# Patient Record
Sex: FEMALE | Race: WHITE | NOT HISPANIC OR LATINO | URBAN - NONMETROPOLITAN AREA
[De-identification: names, ages, dates, MRNs, and addresses within clinical notes are randomized per-mention and may not be internally consistent; named-entity substitution may affect disease eponyms.]

---

## 2021-07-19 ENCOUNTER — OFFICE VISIT (OUTPATIENT)
Dept: URBAN - NONMETROPOLITAN AREA CLINIC 2 | Facility: CLINIC | Age: 46
End: 2021-07-19
Payer: COMMERCIAL

## 2021-07-19 ENCOUNTER — WEB ENCOUNTER (OUTPATIENT)
Dept: URBAN - NONMETROPOLITAN AREA CLINIC 2 | Facility: CLINIC | Age: 46
End: 2021-07-19

## 2021-07-19 ENCOUNTER — LAB OUTSIDE AN ENCOUNTER (OUTPATIENT)
Dept: URBAN - NONMETROPOLITAN AREA CLINIC 2 | Facility: CLINIC | Age: 46
End: 2021-07-19

## 2021-07-19 VITALS
BODY MASS INDEX: 22.91 KG/M2 | WEIGHT: 146 LBS | SYSTOLIC BLOOD PRESSURE: 104 MMHG | HEIGHT: 67 IN | HEART RATE: 67 BPM | DIASTOLIC BLOOD PRESSURE: 67 MMHG

## 2021-07-19 DIAGNOSIS — K57.32 DIVERTICULITIS OF LARGE INTESTINE WITHOUT PERFORATION OR ABSCESS WITHOUT BLEEDING: ICD-10-CM

## 2021-07-19 DIAGNOSIS — N80.9 ENDOMETRIOSIS: ICD-10-CM

## 2021-07-19 DIAGNOSIS — Z72.0 TOBACCO USE: ICD-10-CM

## 2021-07-19 PROCEDURE — 99204 OFFICE O/P NEW MOD 45 MIN: CPT | Performed by: INTERNAL MEDICINE

## 2021-07-19 NOTE — PHYSICAL EXAM HENT:
Head,  normocephalic,  atraumatic,  Face,  Face within normal limits,  Ears,  External ears within normal limits,  Nose/Nasopharynx,  External nose  normal appearance,  nares patent,  no nasal discharge,  Mouth and Throat,  Oral cavity appearance normal, Lips,  Appearance normal  not examined

## 2021-07-19 NOTE — HPI-TODAY'S VISIT:
7/19/2021: Initial Gastroenterology Clinic Visit  45 year old female with past medical history of tobacco use, diverticulitis c/b abscess, endometriosis who presents for evaluation of abdominal discomfort.  Ms. Reagan has had a histroy of diverticulitis in 2014 c/b abscess. She notes that since her diagnosis of diverticulitis c/b abscess in 2014 she had had several episodes that feel like diverticulitis episodes where she will experience discomfort in the pelvic region/left side of the abdomen as well as abdominal bloating. She most recently experienced these symptoms three weeks ago when she ate a heavy meal while in [place]. Following the heavy meal, she experienced left lower quadrant abdominal discomfort. She subsequently transitioned to a clear liquid/low fiber diet which led to resolution of symptoms. She denied fevers or chills. She denied vomiting.  She did not have a CT scan following the most recent episode of abdominal discomfort.  She did not have a colonoscopy following her episode of diverticulitis in 2014.   She denies family history of inflammatory bowel disease or colon cancer.  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

## 2021-07-19 NOTE — PHYSICAL EXAM GASTROINTESTINAL
Abdomen , soft, slight tenderness in the left lower quadrant , nondistended , no guarding or rigidity , no masses palpable , normal bowel sounds , Liver and Spleen , no hepatomegaly present , no hepatosplenomegaly , liver nontender , spleen not palpable

## 2021-07-20 LAB
A/G RATIO: 1.6
ALBUMIN: 4.4
ALKALINE PHOSPHATASE: 65
ALT (SGPT): 7
AST (SGOT): 12
BASO (ABSOLUTE): 0.1
BASOS: 1
BILIRUBIN, TOTAL: 0.2
BUN/CREATININE RATIO: 20
BUN: 14
C-REACTIVE PROTEIN, QUANT: <1
CALCIUM: 9.1
CARBON DIOXIDE, TOTAL: 21
CHLORIDE: 104
CREATININE: 0.69
EGFR IF AFRICN AM: 122
EGFR IF NONAFRICN AM: 106
EOS (ABSOLUTE): 0.3
EOS: 5
GLOBULIN, TOTAL: 2.7
GLUCOSE: 79
HEMATOCRIT: 43.7
HEMATOLOGY COMMENTS:: (no result)
HEMOGLOBIN: 14.2
IMMATURE CELLS: (no result)
IMMATURE GRANS (ABS): 0
IMMATURE GRANULOCYTES: 0
LYMPHS (ABSOLUTE): 2.4
LYMPHS: 33
MCH: 32.3
MCHC: 32.5
MCV: 99
MONOCYTES(ABSOLUTE): 0.4
MONOCYTES: 5
NEUTROPHILS (ABSOLUTE): 4.1
NEUTROPHILS: 56
NRBC: (no result)
PLATELETS: 375
POTASSIUM: 4.7
PROTEIN, TOTAL: 7.1
RBC: 4.4
RDW: 12.9
SODIUM: 140
WBC: 7.4

## 2021-07-24 PROBLEM — 4494009: Status: ACTIVE | Noted: 2021-07-19

## 2021-07-24 PROBLEM — 129103003: Status: ACTIVE | Noted: 2021-07-24

## 2021-08-16 ENCOUNTER — TELEPHONE ENCOUNTER (OUTPATIENT)
Dept: URBAN - NONMETROPOLITAN AREA CLINIC 2 | Facility: CLINIC | Age: 46
End: 2021-08-16

## 2021-08-17 ENCOUNTER — TELEPHONE ENCOUNTER (OUTPATIENT)
Dept: URBAN - METROPOLITAN AREA CLINIC 40 | Facility: CLINIC | Age: 46
End: 2021-08-17

## 2021-08-20 ENCOUNTER — TELEPHONE ENCOUNTER (OUTPATIENT)
Dept: URBAN - METROPOLITAN AREA CLINIC 23 | Facility: CLINIC | Age: 46
End: 2021-08-20

## 2021-08-25 ENCOUNTER — LAB OUTSIDE AN ENCOUNTER (OUTPATIENT)
Dept: URBAN - NONMETROPOLITAN AREA CLINIC 2 | Facility: CLINIC | Age: 46
End: 2021-08-25

## 2021-08-25 ENCOUNTER — TELEPHONE ENCOUNTER (OUTPATIENT)
Dept: URBAN - NONMETROPOLITAN AREA CLINIC 2 | Facility: CLINIC | Age: 46
End: 2021-08-25

## 2021-08-25 PROBLEM — 300331000: Status: ACTIVE | Noted: 2021-08-25

## 2021-09-17 ENCOUNTER — OFFICE VISIT (OUTPATIENT)
Dept: URBAN - NONMETROPOLITAN AREA CLINIC 2 | Facility: CLINIC | Age: 46
End: 2021-09-17

## 2021-10-11 ENCOUNTER — LAB OUTSIDE AN ENCOUNTER (OUTPATIENT)
Dept: URBAN - NONMETROPOLITAN AREA CLINIC 2 | Facility: CLINIC | Age: 46
End: 2021-10-11

## 2021-10-11 ENCOUNTER — TELEPHONE ENCOUNTER (OUTPATIENT)
Dept: URBAN - NONMETROPOLITAN AREA CLINIC 2 | Facility: CLINIC | Age: 46
End: 2021-10-11

## 2021-11-01 ENCOUNTER — OFFICE VISIT (OUTPATIENT)
Dept: URBAN - NONMETROPOLITAN AREA CLINIC 2 | Facility: CLINIC | Age: 46
End: 2021-11-01
Payer: COMMERCIAL

## 2021-11-01 VITALS
TEMPERATURE: 97 F | DIASTOLIC BLOOD PRESSURE: 86 MMHG | HEART RATE: 83 BPM | BODY MASS INDEX: 23.54 KG/M2 | WEIGHT: 150 LBS | HEIGHT: 67 IN | SYSTOLIC BLOOD PRESSURE: 126 MMHG

## 2021-11-01 DIAGNOSIS — Z12.11 COLON CANCER SCREENING: ICD-10-CM

## 2021-11-01 DIAGNOSIS — Z87.19 HISTORY OF DIVERTICULITIS: ICD-10-CM

## 2021-11-01 DIAGNOSIS — K76.9 LIVER LESION: ICD-10-CM

## 2021-11-01 DIAGNOSIS — Z72.0 TOBACCO USE: ICD-10-CM

## 2021-11-01 PROCEDURE — 99214 OFFICE O/P EST MOD 30 MIN: CPT | Performed by: INTERNAL MEDICINE

## 2021-11-01 RX ORDER — POLYETHYLENE GLYCOL 3350, SODIUM SULFATE, SODIUM CHLORIDE, POTASSIUM CHLORIDE, ASCORBIC ACID, SODIUM ASCORBATE 140-9-5.2G
AS DIRECTED KIT ORAL AS DIRECTED
Qty: 280 GRAM | Refills: 0 | OUTPATIENT
Start: 2021-11-01 | End: 2021-11-02

## 2021-11-01 NOTE — HPI-TODAY'S VISIT:
7/19/2021: Initial Gastroenterology Clinic Visit    45 year old female with past medical history of tobacco use, diverticulitis c/b abscess, endometriosis who presents for evaluation of abdominal discomfort.  Ms. Reagan has had a history of diverticulitis in 2014 c/b abscess. She notes that since her diagnosis of diverticulitis c/b abscess in 2014 she had had several episodes that feel like diverticulitis episodes where she will experience discomfort in the pelvic region/left side of the abdomen as well as abdominal bloating. She most recently experienced these symptoms three weeks ago when she ate a heavy meal while in [place]. Following the heavy meal, she experienced left lower quadrant abdominal discomfort. She subsequently transitioned to a clear liquid/low fiber diet which led to resolution of symptoms. She denied fevers or chills. She denied vomiting.  She did not have a CT scan following the most recent episode of abdominal discomfort.  She did not have a colonoscopy following her episode of diverticulitis in 2014.   She denies family history of inflammatory bowel disease or colon cancer.  She currently works in [employer] on [employer].  7/19/2021: CBC, chemistry panel, LFTs normal. CRP normal. 8/17/2021: CT Abdomen and Pelvis with Contrast  IMPRESSION: 1. No acute findings in abdomen or pelvis. 2. Pancolonic diverticulosis. 10/6/2021: MRI Liver IMPRESSION: 1.  Indeterminate region of nonenhancement along the falciform ligament, as detailed above. Lesion has a benign appearance, and is stable in size dating back to 8/26/2014. Consider 12 month follow-up, if clinically warranted.  11/1/2021: Gastroenterology Follow-Up Visit Since Ms. Reagan's most recent clinic visit, she was diagnosed with COVID and has been dealing with symptomsf of long COVID. She has not had any episodes of abdominal pain. She denies symptoms similar to her prior episodes of diverticulitis.

## 2021-12-29 ENCOUNTER — OFFICE VISIT (OUTPATIENT)
Dept: URBAN - NONMETROPOLITAN AREA SURGERY CENTER 1 | Facility: SURGERY CENTER | Age: 46
End: 2021-12-29

## 2022-02-25 ENCOUNTER — OFFICE VISIT (OUTPATIENT)
Dept: URBAN - NONMETROPOLITAN AREA CLINIC 2 | Facility: CLINIC | Age: 47
End: 2022-02-25

## 2022-04-24 ENCOUNTER — TELEPHONE ENCOUNTER (OUTPATIENT)
Dept: URBAN - NONMETROPOLITAN AREA CLINIC 2 | Facility: CLINIC | Age: 47
End: 2022-04-24

## 2022-05-27 ENCOUNTER — WEB ENCOUNTER (OUTPATIENT)
Dept: URBAN - NONMETROPOLITAN AREA SURGERY CENTER 1 | Facility: SURGERY CENTER | Age: 47
End: 2022-05-27

## 2022-05-27 ENCOUNTER — TELEPHONE ENCOUNTER (OUTPATIENT)
Dept: URBAN - NONMETROPOLITAN AREA CLINIC 2 | Facility: CLINIC | Age: 47
End: 2022-05-27

## 2022-06-02 ENCOUNTER — OFFICE VISIT (OUTPATIENT)
Dept: URBAN - NONMETROPOLITAN AREA SURGERY CENTER 1 | Facility: SURGERY CENTER | Age: 47
End: 2022-06-02
Payer: COMMERCIAL

## 2022-06-02 ENCOUNTER — TELEPHONE ENCOUNTER (OUTPATIENT)
Dept: URBAN - NONMETROPOLITAN AREA CLINIC 2 | Facility: CLINIC | Age: 47
End: 2022-06-02

## 2022-06-02 DIAGNOSIS — Z12.11 COLON CANCER SCREENING: ICD-10-CM

## 2022-06-02 DIAGNOSIS — Z53.8 FAILED ATTEMPTED SURGICAL PROCEDURE: ICD-10-CM

## 2022-06-02 PROCEDURE — 45378 DIAGNOSTIC COLONOSCOPY: CPT | Performed by: INTERNAL MEDICINE

## 2022-06-02 PROCEDURE — G8907 PT DOC NO EVENTS ON DISCHARG: HCPCS | Performed by: INTERNAL MEDICINE

## 2022-06-14 ENCOUNTER — OFFICE VISIT (OUTPATIENT)
Dept: URBAN - NONMETROPOLITAN AREA SURGERY CENTER 1 | Facility: SURGERY CENTER | Age: 47
End: 2022-06-14
Payer: COMMERCIAL

## 2022-06-14 ENCOUNTER — TELEPHONE ENCOUNTER (OUTPATIENT)
Dept: URBAN - NONMETROPOLITAN AREA CLINIC 2 | Facility: CLINIC | Age: 47
End: 2022-06-14

## 2022-06-14 DIAGNOSIS — K56.690 OTHER PARTIAL INTESTINAL OBSTRUCTION: ICD-10-CM

## 2022-06-14 DIAGNOSIS — Z53.8 FAILED ATTEMPTED SURGICAL PROCEDURE: ICD-10-CM

## 2022-06-14 DIAGNOSIS — Z12.11 COLON CANCER SCREENING: ICD-10-CM

## 2022-06-14 PROCEDURE — 45378 DIAGNOSTIC COLONOSCOPY: CPT | Performed by: INTERNAL MEDICINE

## 2022-06-14 PROCEDURE — G8907 PT DOC NO EVENTS ON DISCHARG: HCPCS | Performed by: INTERNAL MEDICINE

## 2022-07-13 ENCOUNTER — OFFICE VISIT (OUTPATIENT)
Dept: URBAN - NONMETROPOLITAN AREA CLINIC 13 | Facility: CLINIC | Age: 47
End: 2022-07-13
Payer: COMMERCIAL

## 2022-07-13 ENCOUNTER — TELEPHONE ENCOUNTER (OUTPATIENT)
Dept: URBAN - METROPOLITAN AREA CLINIC 6 | Facility: CLINIC | Age: 47
End: 2022-07-13

## 2022-07-13 ENCOUNTER — LAB OUTSIDE AN ENCOUNTER (OUTPATIENT)
Dept: URBAN - NONMETROPOLITAN AREA CLINIC 13 | Facility: CLINIC | Age: 47
End: 2022-07-13

## 2022-07-13 VITALS
BODY MASS INDEX: 26.21 KG/M2 | HEART RATE: 76 BPM | DIASTOLIC BLOOD PRESSURE: 65 MMHG | WEIGHT: 167 LBS | HEIGHT: 67 IN | SYSTOLIC BLOOD PRESSURE: 100 MMHG

## 2022-07-13 DIAGNOSIS — R93.3 ABNORMAL COLONOSCOPY: ICD-10-CM

## 2022-07-13 DIAGNOSIS — Z72.0 TOBACCO USE: ICD-10-CM

## 2022-07-13 DIAGNOSIS — Z12.11 COLON CANCER SCREENING: ICD-10-CM

## 2022-07-13 DIAGNOSIS — Z87.19 HISTORY OF DIVERTICULITIS: ICD-10-CM

## 2022-07-13 DIAGNOSIS — K76.9 LIVER LESION: ICD-10-CM

## 2022-07-13 PROCEDURE — 99214 OFFICE O/P EST MOD 30 MIN: CPT | Performed by: INTERNAL MEDICINE

## 2022-07-13 RX ORDER — ATORVASTATIN CALCIUM 20 MG/1
1 TABLET TABLET, FILM COATED ORAL ONCE A DAY
Status: ACTIVE | COMMUNITY

## 2022-07-13 RX ORDER — AMITRIPTYLINE HYDROCHLORIDE 10 MG/1
1 TABLET AT BEDTIME TABLET, FILM COATED ORAL ONCE A DAY
Status: ACTIVE | COMMUNITY

## 2022-07-13 NOTE — HPI-TODAY'S VISIT:
7/19/2021: Initial Gastroenterology Clinic Visit      45 year old female with past medical history of tobacco use, diverticulitis c/b abscess, endometriosis who presents for evaluation of abdominal discomfort.  Ms. Reagan has had a history of diverticulitis in 2014 c/b abscess. She notes that since her diagnosis of diverticulitis c/b abscess in 2014 she had had several episodes that feel like diverticulitis episodes where she will experience discomfort in the pelvic region/left side of the abdomen as well as abdominal bloating. She most recently experienced these symptoms three weeks ago when she ate a heavy meal while in [place]. Following the heavy meal, she experienced left lower quadrant abdominal discomfort. She subsequently transitioned to a clear liquid/low fiber diet which led to resolution of symptoms. She denied fevers or chills. She denied vomiting.  She did not have a CT scan following the most recent episode of abdominal discomfort.  She did not have a colonoscopy following her episode of diverticulitis in 2014.   She denies family history of inflammatory bowel disease or colon cancer.  She currently works in [employer] on [employer].  7/19/2021: CBC, chemistry panel, LFTs normal. CRP normal. 8/17/2021: CT Abdomen and Pelvis with Contrast  IMPRESSION: 1. No acute findings in abdomen or pelvis. 2. Pancolonic diverticulosis. 10/6/2021: MRI Liver IMPRESSION: 1.  Indeterminate region of nonenhancement along the falciform ligament, as detailed above. Lesion has a benign appearance, and is stable in size dating back to 8/26/2014. Consider 12 month follow-up, if clinically warranted.  11/1/2021: Gastroenterology Follow-Up Visit Since Ms. Reagan's most recent clinic visit, she was diagnosed with COVID and has been dealing with symptoms of long COVID. She has not had any episodes of abdominal pain. She denies symptoms similar to her prior episodes of diverticulitis.  6/2/2022: Colonoscopy  Multiple small and large mouthed diverticula were found in the sigmoid colon. There was significant muscular hypertrophy of the sigmoid colon with restricted mobility of the colon. Several attempts were made at advancing the colonoscope through the sigmoid colon including placing Ms. Reagan on her back, withdrawing the colonoscope from the colon and re-inserting, and applying abdominal pressure throughout the procedure. Despite efforts, the colonoscope was unable to be advanced to the cecum. As a result, the colonoscopy was aborted. 6/14/2022: Colonoscopy  Many small and large mouthed diverticula were found in the sigmoid colon, transverse colon, ascending colon. Benign appearing intrinsic stenosis measuring 8 cm in length was found in the rectosigmoid colon and was traversed with a gastroscope (the colonoscope was unable to traverse the stricture). Mucosa was erythematous in this region likely indicating inflammatory stricture. The gastroscope was unable to be advanced to the cecum. The exam was otherwise without abnormality on direct and retroflexion.  7/13/2022: Gastroenterology Follow-Up Visit Ms. Reagan is currently being evaluated for long-COVID. Abdominal symptoms are stable. Weight is stable. Denies melena or hematochezia.

## 2022-07-20 ENCOUNTER — TELEPHONE ENCOUNTER (OUTPATIENT)
Dept: URBAN - NONMETROPOLITAN AREA CLINIC 2 | Facility: CLINIC | Age: 47
End: 2022-07-20

## 2022-07-21 ENCOUNTER — WEB ENCOUNTER (OUTPATIENT)
Dept: URBAN - NONMETROPOLITAN AREA CLINIC 2 | Facility: CLINIC | Age: 47
End: 2022-07-21

## 2022-07-21 PROBLEM — 62315008: Status: ACTIVE | Noted: 2022-07-21

## 2022-08-15 ENCOUNTER — TELEPHONE ENCOUNTER (OUTPATIENT)
Dept: URBAN - NONMETROPOLITAN AREA CLINIC 2 | Facility: CLINIC | Age: 47
End: 2022-08-15

## 2022-08-15 RX ORDER — AMITRIPTYLINE HYDROCHLORIDE 10 MG/1
1 TABLET AT BEDTIME TABLET, FILM COATED ORAL ONCE A DAY
Status: ACTIVE | COMMUNITY

## 2022-08-15 RX ORDER — ATORVASTATIN CALCIUM 20 MG/1
1 TABLET TABLET, FILM COATED ORAL ONCE A DAY
Status: ACTIVE | COMMUNITY

## 2022-08-15 RX ORDER — POLYETHYLENE GLYCOL 3350, SODIUM SULFATE ANHYDROUS, SODIUM BICARBONATE, SODIUM CHLORIDE, POTASSIUM CHLORIDE 236; 22.74; 6.74; 5.86; 2.97 G/4L; G/4L; G/4L; G/4L; G/4L
AS DIRECTED POWDER, FOR SOLUTION ORAL ONCE
Qty: 1 GALLON | Refills: 0 | OUTPATIENT
Start: 2022-08-15 | End: 2022-08-16

## 2022-08-19 ENCOUNTER — TELEPHONE ENCOUNTER (OUTPATIENT)
Dept: URBAN - NONMETROPOLITAN AREA CLINIC 2 | Facility: CLINIC | Age: 47
End: 2022-08-19

## 2022-09-23 ENCOUNTER — TELEPHONE ENCOUNTER (OUTPATIENT)
Dept: URBAN - NONMETROPOLITAN AREA CLINIC 2 | Facility: CLINIC | Age: 47
End: 2022-09-23

## 2022-11-07 ENCOUNTER — OFFICE VISIT (OUTPATIENT)
Dept: URBAN - NONMETROPOLITAN AREA CLINIC 2 | Facility: CLINIC | Age: 47
End: 2022-11-07
Payer: COMMERCIAL

## 2022-11-07 VITALS
HEART RATE: 118 BPM | HEIGHT: 67 IN | SYSTOLIC BLOOD PRESSURE: 116 MMHG | DIASTOLIC BLOOD PRESSURE: 81 MMHG | WEIGHT: 170 LBS | BODY MASS INDEX: 26.68 KG/M2

## 2022-11-07 DIAGNOSIS — Z12.11 COLON CANCER SCREENING: ICD-10-CM

## 2022-11-07 DIAGNOSIS — R93.3 ABNORMAL COLONOSCOPY: ICD-10-CM

## 2022-11-07 DIAGNOSIS — K76.9 LIVER LESION: ICD-10-CM

## 2022-11-07 DIAGNOSIS — Z87.19 HISTORY OF DIVERTICULITIS: ICD-10-CM

## 2022-11-07 DIAGNOSIS — K56.699 DIVERTICULAR STRICTURE: ICD-10-CM

## 2022-11-07 PROCEDURE — 99214 OFFICE O/P EST MOD 30 MIN: CPT | Performed by: INTERNAL MEDICINE

## 2022-11-07 RX ORDER — AMITRIPTYLINE HYDROCHLORIDE 10 MG/1
1 TABLET AT BEDTIME TABLET, FILM COATED ORAL ONCE A DAY
Status: ACTIVE | COMMUNITY

## 2022-11-07 RX ORDER — ATORVASTATIN CALCIUM 20 MG/1
1 TABLET TABLET, FILM COATED ORAL ONCE A DAY
Status: ACTIVE | COMMUNITY

## 2022-11-07 NOTE — PHYSICAL EXAM GASTROINTESTINAL
Abdomen , soft, nontender, nondistended , no guarding or rigidity , no masses palpable , normal bowel sounds , ileostomy present

## 2022-11-07 NOTE — HPI-TODAY'S VISIT:
7/19/2021: Initial Gastroenterology Clinic Visit         45 year old female with past medical history of tobacco use, diverticulitis c/b abscess, endometriosis who presents for evaluation of abdominal discomfort.  Ms. Reagan has had a history of diverticulitis in 2014 c/b abscess. She notes that since her diagnosis of diverticulitis c/b abscess in 2014 she had had several episodes that feel like diverticulitis episodes where she will experience discomfort in the pelvic region/left side of the abdomen as well as abdominal bloating. She most recently experienced these symptoms three weeks ago when she ate a heavy meal while in [place]. Following the heavy meal, she experienced left lower quadrant abdominal discomfort. She subsequently transitioned to a clear liquid/low fiber diet which led to resolution of symptoms. She denied fevers or chills. She denied vomiting.  She did not have a CT scan following the most recent episode of abdominal discomfort.  She did not have a colonoscopy following her episode of diverticulitis in 2014.   She denies family history of inflammatory bowel disease or colon cancer.  She currently works in [employer] on [employer].  7/19/2021: CBC, chemistry panel, LFTs normal. CRP normal. 8/17/2021: CT Abdomen and Pelvis with Contrast  IMPRESSION: 1. No acute findings in abdomen or pelvis. 2. Pancolonic diverticulosis. 10/6/2021: MRI Liver IMPRESSION: 1.  Indeterminate region of nonenhancement along the falciform ligament, as detailed above. Lesion has a benign appearance, and is stable in size dating back to 8/26/2014. Consider 12 month follow-up, if clinically warranted.  11/1/2021: Gastroenterology Follow-Up Visit Since Ms. Reagan's most recent clinic visit, she was diagnosed with COVID and has been dealing with symptoms of long COVID. She has not had any episodes of abdominal pain. She denies symptoms similar to her prior episodes of diverticulitis.  6/2/2022: Colonoscopy  Multiple small and large mouthed diverticula were found in the sigmoid colon. There was significant muscular hypertrophy of the sigmoid colon with restricted mobility of the colon. Several attempts were made at advancing the colonoscope through the sigmoid colon including placing Ms. Reagan on her back, withdrawing the colonoscope from the colon and re-inserting, and applying abdominal pressure throughout the procedure. Despite efforts, the colonoscope was unable to be advanced to the cecum. As a result, the colonoscopy was aborted. 6/14/2022: Re-Attempt at Colonoscopy with Dr. Jackson Many small and large mouthed diverticula were found in the sigmoid colon, transverse colon, ascending colon. Benign appearing intrinsic stenosis measuring 8 cm in length was found in the rectosigmoid colon and was traversed with a gastroscope (the colonoscope was unable to traverse the stricture). Mucosa was erythematous in this region likely indicating inflammatory stricture. The gastroscope was unable to be advanced to the cecum. The exam was otherwise without abnormality on direct and retroflexion.  7/13/2022: Gastroenterology Follow-Up Visit Ms. Reagan is currently being evaluated for long-COVID. Abdominal symptoms are stable. Weight is stable. Denies melena or hematochezia.  8/16/2022: Barium Enema IMPRESSION: 1.  7 mm filling defect within the distal transverse colon seen on supine overhead imaging which may represent a polyp. 2.  Mild narrowing and luminal irregularity within the distal descending colon which persists on multiple overhead and spot views measuring a diameter of 8 mm. Correlate with recent colonoscopy. 3.  Colonic diverticulosis.  11/2/2022: Underwent low anterior resection with diverting loop ileostomy with Dr. Domínguez, given suspicion for diverticular stricture in the distal descending colon/sigmoid colon. Surgery revealed extensive diverticular disease with adherence of the sigmoid colon to the pelvic sidewall.  Sigmoid folded over on itself leading to a stricture of the distal sigmoid.  The chronic inflammatory process extended to the proximal rectum requiring a lower resection plane.   11/2/2022: Pathology from surgery A.  SIGMOID COLON AND PROXIMAL RECTUM, RESECTION: DIVERTICULAR DISEASE WITH PERICOLONIC FIBROSIS, LYMPHOHISTIOCYTIC INFLAMMATION, AND FIBROUS ADHESIONS. BENIGN LYMPH NODES. NO MALIGNANCY IDENTIFIED. B.  COLON, LABELED ANASTOMOSIS RINGS, EXCISION: BENIGN SEGMENTS OF LARGE INTESTINE CONSISTENT WITH DONUTS. NO MALIGNANCY IDENTIFIED.  11/7/2022: Gastroenterology Follow-Up Visit Ms. Reagan presents for follow-up. She is continue to follow with Dr. Domínguez following her low anterior resection with diverting loop ileostomy. She has stopped tobacco use.

## 2022-11-21 ENCOUNTER — TELEPHONE ENCOUNTER (OUTPATIENT)
Dept: URBAN - NONMETROPOLITAN AREA CLINIC 2 | Facility: CLINIC | Age: 47
End: 2022-11-21

## 2022-11-21 PROBLEM — 305058001: Status: ACTIVE | Noted: 2021-11-01

## 2022-11-21 PROBLEM — 300331000 LESION OF LIVER: Status: ACTIVE | Noted: 2022-07-12

## 2022-11-21 PROBLEM — 118361000119100: Status: ACTIVE | Noted: 2021-11-09

## 2022-11-21 PROBLEM — 313172000: Status: ACTIVE | Noted: 2022-07-13

## 2022-11-21 PROBLEM — 56226004: Status: ACTIVE | Noted: 2022-11-21

## 2023-04-14 ENCOUNTER — OFFICE VISIT (OUTPATIENT)
Dept: URBAN - NONMETROPOLITAN AREA CLINIC 2 | Facility: CLINIC | Age: 48
End: 2023-04-14
Payer: COMMERCIAL

## 2023-04-14 VITALS
BODY MASS INDEX: 28.56 KG/M2 | DIASTOLIC BLOOD PRESSURE: 73 MMHG | WEIGHT: 182 LBS | HEIGHT: 67 IN | SYSTOLIC BLOOD PRESSURE: 112 MMHG | TEMPERATURE: 97.4 F | HEART RATE: 80 BPM

## 2023-04-14 DIAGNOSIS — Z12.11 COLON CANCER SCREENING: ICD-10-CM

## 2023-04-14 DIAGNOSIS — K56.699 DIVERTICULAR STRICTURE: ICD-10-CM

## 2023-04-14 DIAGNOSIS — K76.9 LIVER LESION: ICD-10-CM

## 2023-04-14 DIAGNOSIS — Z87.19 HISTORY OF DIVERTICULITIS: ICD-10-CM

## 2023-04-14 DIAGNOSIS — K21.9 CHRONIC GERD: ICD-10-CM

## 2023-04-14 DIAGNOSIS — R07.89 OTHER CHEST PAIN: ICD-10-CM

## 2023-04-14 PROBLEM — 235595009: Status: ACTIVE | Noted: 2023-04-14

## 2023-04-14 PROCEDURE — 99214 OFFICE O/P EST MOD 30 MIN: CPT | Performed by: INTERNAL MEDICINE

## 2023-04-14 RX ORDER — ETONOGESTREL 68 MG/1
AS DIRECTED IMPLANT SUBCUTANEOUS
Status: ACTIVE | COMMUNITY

## 2023-04-14 RX ORDER — FAMOTIDINE 40 MG/1
1 TABLET AT BEDTIME TABLET, FILM COATED ORAL ONCE A DAY
Status: ACTIVE | COMMUNITY

## 2023-04-14 RX ORDER — GABAPENTIN 100 MG/1
1 CAPSULE CAPSULE ORAL ONCE A DAY
Status: ACTIVE | COMMUNITY

## 2023-04-14 RX ORDER — ATORVASTATIN CALCIUM 20 MG/1
1 TABLET TABLET, FILM COATED ORAL ONCE A DAY
Status: ACTIVE | COMMUNITY

## 2023-04-14 RX ORDER — CALCIUM CARBONATE 300MG(750)
AS DIRECTED TABLET,CHEWABLE ORAL
Status: ACTIVE | COMMUNITY

## 2023-04-14 RX ORDER — AMITRIPTYLINE HYDROCHLORIDE 10 MG/1
1 TABLET AT BEDTIME TABLET, FILM COATED ORAL ONCE A DAY
Status: ACTIVE | COMMUNITY

## 2023-04-14 NOTE — HPI-TODAY'S VISIT:
7/19/2021: Initial Gastroenterology Clinic Visit         45 year old female with past medical history of tobacco use, diverticulitis c/b abscess, endometriosis who presents for evaluation of abdominal discomfort.  Ms. Reagan has had a history of diverticulitis in 2014 c/b abscess. She notes that since her diagnosis of diverticulitis c/b abscess in 2014 she had had several episodes that feel like diverticulitis episodes where she will experience discomfort in the pelvic region/left side of the abdomen as well as abdominal bloating. She most recently experienced these symptoms three weeks ago when she ate a heavy meal while in [place]. Following the heavy meal, she experienced left lower quadrant abdominal discomfort. She subsequently transitioned to a clear liquid/low fiber diet which led to resolution of symptoms. She denied fevers or chills. She denied vomiting.  She did not have a CT scan following the most recent episode of abdominal discomfort.  She did not have a colonoscopy following her episode of diverticulitis in 2014.   She denies family history of inflammatory bowel disease or colon cancer.  She currently works in [employer] on [employer].  7/19/2021: CBC, chemistry panel, LFTs normal. CRP normal. 8/17/2021: CT Abdomen and Pelvis with Contrast  IMPRESSION: 1. No acute findings in abdomen or pelvis. 2. Pancolonic diverticulosis. 10/6/2021: MRI Liver IMPRESSION: 1.  Indeterminate region of nonenhancement along the falciform ligament, as detailed above. Lesion has a benign appearance, and is stable in size dating back to 8/26/2014. Consider 12 month follow-up, if clinically warranted.  11/1/2021: Gastroenterology Follow-Up Visit Since Ms. Reagan's most recent clinic visit, she was diagnosed with COVID and has been dealing with symptoms of long COVID. She has not had any episodes of abdominal pain. She denies symptoms similar to her prior episodes of diverticulitis.  6/2/2022: Colonoscopy  Multiple small and large mouthed diverticula were found in the sigmoid colon. There was significant muscular hypertrophy of the sigmoid colon with restricted mobility of the colon. Several attempts were made at advancing the colonoscope through the sigmoid colon including placing Ms. Reagan on her back, withdrawing the colonoscope from the colon and re-inserting, and applying abdominal pressure throughout the procedure. Despite efforts, the colonoscope was unable to be advanced to the cecum. As a result, the colonoscopy was aborted. 6/14/2022: Re-Attempt at Colonoscopy with Dr. Jackson Many small and large mouthed diverticula were found in the sigmoid colon, transverse colon, ascending colon. Benign appearing intrinsic stenosis measuring 8 cm in length was found in the rectosigmoid colon and was traversed with a gastroscope (the colonoscope was unable to traverse the stricture). Mucosa was erythematous in this region likely indicating inflammatory stricture. The gastroscope was unable to be advanced to the cecum. The exam was otherwise without abnormality on direct and retroflexion.  7/13/2022: Gastroenterology Follow-Up Visit Ms. Reagan is currently being evaluated for long-COVID. Abdominal symptoms are stable. Weight is stable. Denies melena or hematochezia.  8/16/2022: Barium Enema IMPRESSION: 1.  7 mm filling defect within the distal transverse colon seen on supine overhead imaging which may represent a polyp. 2.  Mild narrowing and luminal irregularity within the distal descending colon which persists on multiple overhead and spot views measuring a diameter of 8 mm. Correlate with recent colonoscopy. 3.  Colonic diverticulosis.  11/2/2022: Underwent low anterior resection with diverting loop ileostomy with Dr. Domínguez, given suspicion for diverticular stricture in the distal descending colon/sigmoid colon. Surgery revealed extensive diverticular disease with adherence of the sigmoid colon to the pelvic sidewall.  Sigmoid folded over on itself leading to a stricture of the distal sigmoid.  The chronic inflammatory process extended to the proximal rectum requiring a lower resection plane.   11/2/2022: Pathology from surgery A.  SIGMOID COLON AND PROXIMAL RECTUM, RESECTION: DIVERTICULAR DISEASE WITH PERICOLONIC FIBROSIS, LYMPHOHISTIOCYTIC INFLAMMATION, AND FIBROUS ADHESIONS. BENIGN LYMPH NODES. NO MALIGNANCY IDENTIFIED. B.  COLON, LABELED ANASTOMOSIS RINGS, EXCISION: BENIGN SEGMENTS OF LARGE INTESTINE CONSISTENT WITH DONUTS. NO MALIGNANCY IDENTIFIED.  11/7/2022: Gastroenterology Follow-Up Visit Ms. Reagan presents for follow-up. She is continue to follow with Dr. Domínguez following her low anterior resection with diverting loop ileostomy. She has stopped tobacco use.  4/14/23: Ms. Valerie Reagan returns for follow-up of sigmoid stricture.  She is s/p low anterior resection with diverting loop ileostomy with Dr. Domínguez.  She has her Ileostomy taken down.  February and has been doing well with this.  She has been complaining of some chest pain and heartburn.  She is worried this may be due to her long COVID syndrome.  She is on Pepcid 40 mg at night for this.  She is having bowel movements again and is feeling well.

## 2023-05-01 ENCOUNTER — OFFICE VISIT (OUTPATIENT)
Dept: URBAN - METROPOLITAN AREA MEDICAL CENTER 1 | Facility: MEDICAL CENTER | Age: 48
End: 2023-05-01
Payer: COMMERCIAL

## 2023-05-01 ENCOUNTER — LAB OUTSIDE AN ENCOUNTER (OUTPATIENT)
Dept: URBAN - NONMETROPOLITAN AREA CLINIC 2 | Facility: CLINIC | Age: 48
End: 2023-05-01

## 2023-05-01 DIAGNOSIS — K63.5 BENIGN COLON POLYP: ICD-10-CM

## 2023-05-01 DIAGNOSIS — R07.89 ACUTE CHEST WALL PAIN: ICD-10-CM

## 2023-05-01 DIAGNOSIS — K22.89 DILATATION OF ESOPHAGUS: ICD-10-CM

## 2023-05-01 DIAGNOSIS — Z12.11 AVERAGE RISK FOR CRC. DUE TO PT'S CO-MORBID STATE WITH END STAGE DEMENTIA, HIGH RISK FOR ANESTHESIA (PER NEUROLOGY); INABILITY TO TAKE A BOWEL PREP....WOULD NOT ADVISE ANY COLORECTAL CANCER SCREENING INCLUDING STOOL TEST FOR FECAL BLOOD.: ICD-10-CM

## 2023-05-01 PROCEDURE — 43239 EGD BIOPSY SINGLE/MULTIPLE: CPT | Performed by: INTERNAL MEDICINE

## 2023-05-01 PROCEDURE — 45385 COLONOSCOPY W/LESION REMOVAL: CPT | Performed by: INTERNAL MEDICINE

## 2023-05-02 LAB
AP CASE REPORT: (no result)
AP FINAL DIAGNOSIS: (no result)
AP GROSS DESCRIPTION: (no result)
AP MICROSCOPIC DESCRIPTION: (no result)

## 2023-11-01 ENCOUNTER — OFFICE VISIT (OUTPATIENT)
Dept: URBAN - NONMETROPOLITAN AREA CLINIC 2 | Facility: CLINIC | Age: 48
End: 2023-11-01
Payer: COMMERCIAL

## 2023-11-01 VITALS
SYSTOLIC BLOOD PRESSURE: 105 MMHG | BODY MASS INDEX: 31.08 KG/M2 | WEIGHT: 198 LBS | DIASTOLIC BLOOD PRESSURE: 63 MMHG | HEIGHT: 67 IN | HEART RATE: 69 BPM

## 2023-11-01 DIAGNOSIS — Z87.19 HISTORY OF DIVERTICULITIS: ICD-10-CM

## 2023-11-01 DIAGNOSIS — K56.699 DIVERTICULAR STRICTURE: ICD-10-CM

## 2023-11-01 DIAGNOSIS — K76.9 LIVER LESION: ICD-10-CM

## 2023-11-01 DIAGNOSIS — R07.89 OTHER CHEST PAIN: ICD-10-CM

## 2023-11-01 DIAGNOSIS — K21.9 CHRONIC GERD: ICD-10-CM

## 2023-11-01 DIAGNOSIS — Z12.11 COLON CANCER SCREENING: ICD-10-CM

## 2023-11-01 PROCEDURE — 99213 OFFICE O/P EST LOW 20 MIN: CPT | Performed by: INTERNAL MEDICINE

## 2023-11-01 RX ORDER — AMITRIPTYLINE HYDROCHLORIDE 10 MG/1
1 TABLET AT BEDTIME TABLET, FILM COATED ORAL ONCE A DAY
Status: ACTIVE | COMMUNITY

## 2023-11-01 RX ORDER — FAMOTIDINE 40 MG/1
1 TABLET AT BEDTIME TABLET, FILM COATED ORAL ONCE A DAY
Status: ACTIVE | COMMUNITY

## 2023-11-01 RX ORDER — ATORVASTATIN CALCIUM 20 MG/1
1 TABLET TABLET, FILM COATED ORAL ONCE A DAY
Status: ACTIVE | COMMUNITY

## 2023-11-01 RX ORDER — GABAPENTIN 100 MG/1
1 CAPSULE CAPSULE ORAL ONCE A DAY
Status: ACTIVE | COMMUNITY

## 2023-11-01 RX ORDER — ETONOGESTREL 68 MG/1
AS DIRECTED IMPLANT SUBCUTANEOUS
Status: ACTIVE | COMMUNITY

## 2023-11-01 RX ORDER — NEBIVOLOL 5 MG/1
1 TABLET TABLET ORAL ONCE A DAY
Status: ACTIVE | COMMUNITY

## 2023-11-01 RX ORDER — CALCIUM CARBONATE 300MG(750)
AS DIRECTED TABLET,CHEWABLE ORAL
Status: ACTIVE | COMMUNITY

## 2023-11-01 RX ORDER — OMEPRAZOLE 20 MG/1
1 CAPSULE 30 MINUTES BEFORE MORNING MEAL CAPSULE, DELAYED RELEASE ORAL ONCE A DAY
Qty: 90 | Refills: 3 | OUTPATIENT
Start: 2023-11-01

## 2023-11-01 NOTE — HPI-TODAY'S VISIT:
7/19/2021: Initial Gastroenterology Clinic Visit         45 year old female with past medical history of tobacco use, diverticulitis c/b abscess, endometriosis who presents for evaluation of abdominal discomfort.  Ms. Reagan has had a history of diverticulitis in 2014 c/b abscess. She notes that since her diagnosis of diverticulitis c/b abscess in 2014 she had had several episodes that feel like diverticulitis episodes where she will experience discomfort in the pelvic region/left side of the abdomen as well as abdominal bloating. She most recently experienced these symptoms three weeks ago when she ate a heavy meal while in [place]. Following the heavy meal, she experienced left lower quadrant abdominal discomfort. She subsequently transitioned to a clear liquid/low fiber diet which led to resolution of symptoms. She denied fevers or chills. She denied vomiting.  She did not have a CT scan following the most recent episode of abdominal discomfort.  She did not have a colonoscopy following her episode of diverticulitis in 2014.   She denies family history of inflammatory bowel disease or colon cancer.  She currently works in [employer] on [employer].  7/19/2021: CBC, chemistry panel, LFTs normal. CRP normal. 8/17/2021: CT Abdomen and Pelvis with Contrast  IMPRESSION: 1. No acute findings in abdomen or pelvis. 2. Pancolonic diverticulosis. 10/6/2021: MRI Liver IMPRESSION: 1.  Indeterminate region of nonenhancement along the falciform ligament, as detailed above. Lesion has a benign appearance, and is stable in size dating back to 8/26/2014. Consider 12 month follow-up, if clinically warranted.  11/1/2021: Gastroenterology Follow-Up Visit Since Ms. Reagan's most recent clinic visit, she was diagnosed with COVID and has been dealing with symptoms of long COVID. She has not had any episodes of abdominal pain. She denies symptoms similar to her prior episodes of diverticulitis.  6/2/2022: Colonoscopy  Multiple small and large mouthed diverticula were found in the sigmoid colon. There was significant muscular hypertrophy of the sigmoid colon with restricted mobility of the colon. Several attempts were made at advancing the colonoscope through the sigmoid colon including placing Ms. Reagan on her back, withdrawing the colonoscope from the colon and re-inserting, and applying abdominal pressure throughout the procedure. Despite efforts, the colonoscope was unable to be advanced to the cecum. As a result, the colonoscopy was aborted. 6/14/2022: Re-Attempt at Colonoscopy with Dr. Jackson Many small and large mouthed diverticula were found in the sigmoid colon, transverse colon, ascending colon. Benign appearing intrinsic stenosis measuring 8 cm in length was found in the rectosigmoid colon and was traversed with a gastroscope (the colonoscope was unable to traverse the stricture). Mucosa was erythematous in this region likely indicating inflammatory stricture. The gastroscope was unable to be advanced to the cecum. The exam was otherwise without abnormality on direct and retroflexion.  7/13/2022: Gastroenterology Follow-Up Visit Ms. Reagan is currently being evaluated for long-COVID. Abdominal symptoms are stable. Weight is stable. Denies melena or hematochezia.  8/16/2022: Barium Enema IMPRESSION: 1.  7 mm filling defect within the distal transverse colon seen on supine overhead imaging which may represent a polyp. 2.  Mild narrowing and luminal irregularity within the distal descending colon which persists on multiple overhead and spot views measuring a diameter of 8 mm. Correlate with recent colonoscopy. 3.  Colonic diverticulosis.  11/2/2022: Underwent low anterior resection with diverting loop ileostomy with Dr. Domínguez, given suspicion for diverticular stricture in the distal descending colon/sigmoid colon. Surgery revealed extensive diverticular disease with adherence of the sigmoid colon to the pelvic sidewall.  Sigmoid folded over on itself leading to a stricture of the distal sigmoid.  The chronic inflammatory process extended to the proximal rectum requiring a lower resection plane.   11/2/2022: Pathology from surgery A.  SIGMOID COLON AND PROXIMAL RECTUM, RESECTION: DIVERTICULAR DISEASE WITH PERICOLONIC FIBROSIS, LYMPHOHISTIOCYTIC INFLAMMATION, AND FIBROUS ADHESIONS. BENIGN LYMPH NODES. NO MALIGNANCY IDENTIFIED. B.  COLON, LABELED ANASTOMOSIS RINGS, EXCISION: BENIGN SEGMENTS OF LARGE INTESTINE CONSISTENT WITH DONUTS. NO MALIGNANCY IDENTIFIED.  11/7/2022: Gastroenterology Follow-Up Visit Ms. Reagan presents for follow-up. She is continue to follow with Dr. Domínguez following her low anterior resection with diverting loop ileostomy. She has stopped tobacco use.  4/14/23: Ms. Valerie Reagan returns for follow-up of sigmoid stricture.  She is s/p low anterior resection with diverting loop ileostomy with Dr. Domínguez.  She has her Ileostomy taken down.  February and has been doing well with this.  She has been complaining of some chest pain and heartburn.  She is worried this may be due to her long COVID syndrome.  She is on Pepcid 40 mg at night for this.  She is having bowel movements again and is feeling well.  11/1/23: Ms. Reagan returns for follow-up of sigmoid stricture and chronic GERD.  She is s/p low anterior resection with diverting loop ileostomy with Dr. Domínguez. She has her Ileostomy taken down in February and has been doing well with this.  She had an EGD and colonoscopy. EGD with reflux esophagitis and colonoscopy with patent colonic anastomosis.  She is doing better on omeprazole 40 mg twice daily but would like to wean her meds.

## 2023-11-01 NOTE — PHYSICAL EXAM CONSTITUTIONAL:
well developed, well nourished , in no acute distress , ambulating without difficulty , normal communication ability Posterior Auricular Interpolation Flap Text: A decision was made to reconstruct the defect utilizing an interpolation axial flap and a staged reconstruction.  A telfa template was made of the defect.  This telfa template was then used to outline the posterior auricular interpolation flap.  The donor area for the pedicle flap was then injected with anesthesia.  The flap was excised through the skin and subcutaneous tissue down to the layer of the underlying musculature.  The pedicle flap was carefully excised within this deep plane to maintain its blood supply.  The edges of the donor site were undermined.   The donor site was closed in a primary fashion.  The pedicle was then rotated into position and sutured.  Once the tube was sutured into place, adequate blood supply was confirmed with blanching and refill.  The pedicle was then wrapped with xeroform gauze and dressed appropriately with a telfa and gauze bandage to ensure continued blood supply and protect the attached pedicle.

## 2024-01-17 ENCOUNTER — TELEPHONE ENCOUNTER (OUTPATIENT)
Dept: URBAN - NONMETROPOLITAN AREA CLINIC 2 | Facility: CLINIC | Age: 49
End: 2024-01-17

## 2024-01-17 RX ORDER — FAMOTIDINE 40 MG/1
1 TABLET AT BEDTIME TABLET, FILM COATED ORAL ONCE A DAY
Qty: 90 | Refills: 3

## 2024-05-01 ENCOUNTER — DASHBOARD ENCOUNTERS (OUTPATIENT)
Age: 49
End: 2024-05-01

## 2024-05-01 ENCOUNTER — OFFICE VISIT (OUTPATIENT)
Dept: URBAN - NONMETROPOLITAN AREA CLINIC 2 | Facility: CLINIC | Age: 49
End: 2024-05-01
Payer: COMMERCIAL

## 2024-05-01 VITALS
SYSTOLIC BLOOD PRESSURE: 125 MMHG | WEIGHT: 200.8 LBS | HEART RATE: 67 BPM | BODY MASS INDEX: 31.52 KG/M2 | DIASTOLIC BLOOD PRESSURE: 62 MMHG | HEIGHT: 67 IN

## 2024-05-01 DIAGNOSIS — K56.699 DIVERTICULAR STRICTURE: ICD-10-CM

## 2024-05-01 DIAGNOSIS — K21.9 CHRONIC GERD: ICD-10-CM

## 2024-05-01 DIAGNOSIS — Z12.11 COLON CANCER SCREENING: ICD-10-CM

## 2024-05-01 DIAGNOSIS — Z87.19 HISTORY OF DIVERTICULITIS: ICD-10-CM

## 2024-05-01 DIAGNOSIS — R07.89 OTHER CHEST PAIN: ICD-10-CM

## 2024-05-01 DIAGNOSIS — K76.9 LIVER LESION: ICD-10-CM

## 2024-05-01 DIAGNOSIS — R14.3 FLATULENCE: ICD-10-CM

## 2024-05-01 DIAGNOSIS — R14.0 BLOATING: ICD-10-CM

## 2024-05-01 PROCEDURE — 99214 OFFICE O/P EST MOD 30 MIN: CPT | Performed by: INTERNAL MEDICINE

## 2024-05-01 RX ORDER — CETIRIZINE HYDROCHLORIDE 10 MG/1
1 TABLET TABLET, FILM COATED ORAL ONCE A DAY
Status: ACTIVE | COMMUNITY

## 2024-05-01 RX ORDER — GABAPENTIN 100 MG/1
1 CAPSULE CAPSULE ORAL ONCE A DAY
Status: ACTIVE | COMMUNITY

## 2024-05-01 RX ORDER — FERROUS SULFATE TAB 325 MG (65 MG ELEMENTAL FE) 325 (65 FE) MG
1 TABLET TAB ORAL
Status: ACTIVE | COMMUNITY

## 2024-05-01 RX ORDER — METRONIDAZOLE 250 MG/1
1 TABLET TABLET ORAL THREE TIMES A DAY
Qty: 42 TABLET | Refills: 1 | OUTPATIENT
Start: 2024-05-01 | End: 2024-05-29

## 2024-05-01 RX ORDER — OMEPRAZOLE 20 MG/1
1 CAPSULE 30 MINUTES BEFORE MORNING MEAL CAPSULE, DELAYED RELEASE ORAL ONCE A DAY
Qty: 90 | Refills: 3 | OUTPATIENT

## 2024-05-01 RX ORDER — FAMOTIDINE 40 MG/1
1 TABLET AT BEDTIME TABLET, FILM COATED ORAL ONCE A DAY
Qty: 90 | Refills: 3 | Status: ACTIVE | COMMUNITY

## 2024-05-01 RX ORDER — NEBIVOLOL 5 MG/1
1 TABLET TABLET ORAL ONCE A DAY
Status: ACTIVE | COMMUNITY

## 2024-05-01 RX ORDER — ETONOGESTREL 68 MG/1
AS DIRECTED IMPLANT SUBCUTANEOUS
Status: ACTIVE | COMMUNITY

## 2024-05-01 RX ORDER — AMITRIPTYLINE HYDROCHLORIDE 10 MG/1
1 TABLET AT BEDTIME TABLET, FILM COATED ORAL ONCE A DAY
Status: ACTIVE | COMMUNITY

## 2024-05-01 RX ORDER — CALCIUM CARBONATE 300MG(750)
AS DIRECTED TABLET,CHEWABLE ORAL
Status: ACTIVE | COMMUNITY

## 2024-05-01 RX ORDER — ATORVASTATIN CALCIUM 20 MG/1
1 TABLET TABLET, FILM COATED ORAL ONCE A DAY
Status: ACTIVE | COMMUNITY

## 2024-05-01 RX ORDER — OMEPRAZOLE 20 MG/1
1 CAPSULE 30 MINUTES BEFORE MORNING MEAL CAPSULE, DELAYED RELEASE ORAL ONCE A DAY
Qty: 90 | Refills: 3 | Status: ACTIVE | COMMUNITY
Start: 2023-11-01

## 2024-09-04 ENCOUNTER — LAB OUTSIDE AN ENCOUNTER (OUTPATIENT)
Dept: URBAN - NONMETROPOLITAN AREA CLINIC 2 | Facility: CLINIC | Age: 49
End: 2024-09-04

## 2024-09-04 ENCOUNTER — OFFICE VISIT (OUTPATIENT)
Dept: URBAN - NONMETROPOLITAN AREA CLINIC 2 | Facility: CLINIC | Age: 49
End: 2024-09-04
Payer: COMMERCIAL

## 2024-09-04 VITALS
SYSTOLIC BLOOD PRESSURE: 106 MMHG | BODY MASS INDEX: 31.39 KG/M2 | WEIGHT: 200 LBS | HEART RATE: 66 BPM | HEIGHT: 67 IN | DIASTOLIC BLOOD PRESSURE: 69 MMHG

## 2024-09-04 DIAGNOSIS — K56.699 DIVERTICULAR STRICTURE: ICD-10-CM

## 2024-09-04 DIAGNOSIS — R14.3 FLATULENCE: ICD-10-CM

## 2024-09-04 DIAGNOSIS — R07.89 OTHER CHEST PAIN: ICD-10-CM

## 2024-09-04 DIAGNOSIS — K76.9 LIVER LESION: ICD-10-CM

## 2024-09-04 DIAGNOSIS — Z12.11 COLON CANCER SCREENING: ICD-10-CM

## 2024-09-04 DIAGNOSIS — R14.0 BLOATING: ICD-10-CM

## 2024-09-04 DIAGNOSIS — K21.9 CHRONIC GERD: ICD-10-CM

## 2024-09-04 DIAGNOSIS — Z87.19 HISTORY OF DIVERTICULITIS: ICD-10-CM

## 2024-09-04 PROCEDURE — 99214 OFFICE O/P EST MOD 30 MIN: CPT | Performed by: INTERNAL MEDICINE

## 2024-09-04 RX ORDER — CALCIUM CARBONATE 300MG(750)
AS DIRECTED TABLET,CHEWABLE ORAL
Status: ON HOLD | COMMUNITY

## 2024-09-04 RX ORDER — OMEPRAZOLE 20 MG/1
1 CAPSULE 30 MINUTES BEFORE MORNING MEAL CAPSULE, DELAYED RELEASE ORAL ONCE A DAY
Qty: 90 | Refills: 3 | OUTPATIENT

## 2024-09-04 RX ORDER — AMITRIPTYLINE HYDROCHLORIDE 10 MG/1
1 TABLET AT BEDTIME TABLET, FILM COATED ORAL ONCE A DAY
Status: ACTIVE | COMMUNITY

## 2024-09-04 RX ORDER — OMEPRAZOLE 20 MG/1
1 CAPSULE 30 MINUTES BEFORE MORNING MEAL CAPSULE, DELAYED RELEASE ORAL ONCE A DAY
Qty: 90 | Refills: 3 | Status: ACTIVE | COMMUNITY

## 2024-09-04 RX ORDER — NORETHINDRONE 0.35 MG/1
1 TABLET TABLET ORAL ONCE A DAY
Status: ACTIVE | COMMUNITY

## 2024-09-04 RX ORDER — NEBIVOLOL 5 MG/1
1 TABLET TABLET ORAL ONCE A DAY
Status: ACTIVE | COMMUNITY

## 2024-09-04 RX ORDER — GABAPENTIN 100 MG/1
1 CAPSULE CAPSULE ORAL ONCE A DAY
Status: ACTIVE | COMMUNITY

## 2024-09-04 RX ORDER — ATORVASTATIN CALCIUM 20 MG/1
1 TABLET TABLET, FILM COATED ORAL ONCE A DAY
Status: ACTIVE | COMMUNITY

## 2024-09-04 RX ORDER — CETIRIZINE HYDROCHLORIDE 10 MG/1
1 TABLET TABLET, FILM COATED ORAL ONCE A DAY
Status: ACTIVE | COMMUNITY

## 2024-09-04 RX ORDER — FAMOTIDINE 40 MG/1
1 TABLET AT BEDTIME TABLET, FILM COATED ORAL ONCE A DAY
Qty: 90 | Refills: 3 | Status: ACTIVE | COMMUNITY

## 2024-09-04 RX ORDER — ETONOGESTREL 68 MG/1
AS DIRECTED IMPLANT SUBCUTANEOUS
Status: ACTIVE | COMMUNITY

## 2024-09-04 RX ORDER — FERROUS SULFATE TAB 325 MG (65 MG ELEMENTAL FE) 325 (65 FE) MG
1 TABLET TAB ORAL
Status: ACTIVE | COMMUNITY

## 2024-09-04 NOTE — HPI-TODAY'S VISIT:
7/19/2021: Initial Gastroenterology Clinic Visit         45 year old female with past medical history of tobacco use, diverticulitis c/b abscess, endometriosis who presents for evaluation of abdominal discomfort.  Ms. Reagan has had a history of diverticulitis in 2014 c/b abscess. She notes that since her diagnosis of diverticulitis c/b abscess in 2014 she had had several episodes that feel like diverticulitis episodes where she will experience discomfort in the pelvic region/left side of the abdomen as well as abdominal bloating. She most recently experienced these symptoms three weeks ago when she ate a heavy meal while in [place]. Following the heavy meal, she experienced left lower quadrant abdominal discomfort. She subsequently transitioned to a clear liquid/low fiber diet which led to resolution of symptoms. She denied fevers or chills. She denied vomiting.  She did not have a CT scan following the most recent episode of abdominal discomfort.  She did not have a colonoscopy following her episode of diverticulitis in 2014.  She denies family history of inflammatory bowel disease or colon cancer.  She currently works in [employer] on [employer].  7/19/2021: CBC, chemistry panel, LFTs normal. CRP normal. 8/17/2021: CT Abdomen and Pelvis with Contrast  IMPRESSION: 1. No acute findings in abdomen or pelvis. 2. Pancolonic diverticulosis. 10/6/2021: MRI Liver IMPRESSION: 1.  Indeterminate region of nonenhancement along the falciform ligament, as detailed above. Lesion has a benign appearance, and is stable in size dating back to 8/26/2014. Consider 12 month follow-up, if clinically warranted.  11/1/2021: Gastroenterology Follow-Up Visit Since Ms. Reagan's most recent clinic visit, she was diagnosed with COVID and has been dealing with symptoms of long COVID. She has not had any episodes of abdominal pain. She denies symptoms similar to her prior episodes of diverticulitis.  6/2/2022: Colonoscopy  Multiple small and large mouthed diverticula were found in the sigmoid colon. There was significant muscular hypertrophy of the sigmoid colon with restricted mobility of the colon. Several attempts were made at advancing the colonoscope through the sigmoid colon including placing Ms. Reagan on her back, withdrawing the colonoscope from the colon and re-inserting, and applying abdominal pressure throughout the procedure. Despite efforts, the colonoscope was unable to be advanced to the cecum. As a result, the colonoscopy was aborted. 6/14/2022: Re-Attempt at Colonoscopy with Dr. Jackson Many small and large mouthed diverticula were found in the sigmoid colon, transverse colon, ascending colon. Benign appearing intrinsic stenosis measuring 8 cm in length was found in the rectosigmoid colon and was traversed with a gastroscope (the colonoscope was unable to traverse the stricture). Mucosa was erythematous in this region likely indicating inflammatory stricture. The gastroscope was unable to be advanced to the cecum. The exam was otherwise without abnormality on direct and retroflexion.  7/13/2022: Gastroenterology Follow-Up Visit Ms. Reagan is currently being evaluated for long-COVID. Abdominal symptoms are stable. Weight is stable. Denies melena or hematochezia.  8/16/2022: Barium Enema IMPRESSION: 1.  7 mm filling defect within the distal transverse colon seen on supine overhead imaging which may represent a polyp. 2.  Mild narrowing and luminal irregularity within the distal descending colon which persists on multiple overhead and spot views measuring a diameter of 8 mm. Correlate with recent colonoscopy. 3.  Colonic diverticulosis.  11/2/2022: Underwent low anterior resection with diverting loop ileostomy with Dr. Domínguez, given suspicion for diverticular stricture in the distal descending colon/sigmoid colon. Surgery revealed extensive diverticular disease with adherence of the sigmoid colon to the pelvic sidewall.  Sigmoid folded over on itself leading to a stricture of the distal sigmoid.  The chronic inflammatory process extended to the proximal rectum requiring a lower resection plane.   11/2/2022: Pathology from surgery A.  SIGMOID COLON AND PROXIMAL RECTUM, RESECTION: DIVERTICULAR DISEASE WITH PERICOLONIC FIBROSIS, LYMPHOHISTIOCYTIC INFLAMMATION, AND FIBROUS ADHESIONS. BENIGN LYMPH NODES. NO MALIGNANCY IDENTIFIED. B.  COLON, LABELED ANASTOMOSIS RINGS, EXCISION: BENIGN SEGMENTS OF LARGE INTESTINE CONSISTENT WITH DONUTS. NO MALIGNANCY IDENTIFIED.  11/7/2022: Gastroenterology Follow-Up Visit Ms. Reagan presents for follow-up. She is continue to follow with Dr. Domínguez following her low anterior resection with diverting loop ileostomy. She has stopped tobacco use.  4/14/23: Ms. Valerie Reagan returns for follow-up of sigmoid stricture.  She is s/p low anterior resection with diverting loop ileostomy with Dr. Domínguez.  She has her Ileostomy taken down.  February and has been doing well with this.  She has been complaining of some chest pain and heartburn.  She is worried this may be due to her long COVID syndrome.  She is on Pepcid 40 mg at night for this.  She is having bowel movements again and is feeling well.  11/1/23: Ms. Reagan returns for follow-up of sigmoid stricture and chronic GERD.  She is s/p low anterior resection with diverting loop ileostomy with Dr. Domínguez. She has her Ileostomy taken down in February and has been doing well with this.  She had an EGD and colonoscopy. EGD with reflux esophagitis and colonoscopy with patent colonic anastomosis.  She is doing better on omeprazole 40 mg twice daily but would like to wean her meds.  5/1/24: Ms. Reagan returns for follow-up of sigmoid stricture and chronic GERD.  She is s/p low anterior resection with diverting loop ileostomy with Dr. Domínguez. She has her Ileostomy taken down in February 2023.  She has been having a lot of bloating and flatulence.  She is also having a lot of reflux particularly with exercise.  She is moving her bowels and her consistency apperas normal.  9/4/24: Ms. Reagan returns for follow-up of sigmoid stricture and chronic GERD.  Sigmoid stricture is felt to be due to diverticulitis history.  She is s/p low anterior resection with diverting loop ileostomy with Dr. Domínguez. She has her Ileostomy taken down in February 2023.Currently she complains of a lot of heartburn and regurgitation as well as bloating.  She is having bowel movements most days.  She has gained weight for unclear reasons.  She does complain of long COVID and sees of the long COVID clinic in [place] for this at [facility].  Antibiotics did not change her symptoms much.  She is still on omeprazole 20 mg in the AM and famotidine nightly.

## 2024-10-18 ENCOUNTER — ERX REFILL RESPONSE (OUTPATIENT)
Dept: URBAN - NONMETROPOLITAN AREA CLINIC 2 | Facility: CLINIC | Age: 49
End: 2024-10-18

## 2024-10-18 RX ORDER — OMEPRAZOLE 20 MG/1
TAKE 1 CAPSULE BY MOUTH EVERY MORNING 30 MINUTES BEFORE BREAKFAST CAPSULE, DELAYED RELEASE ORAL
Qty: 30 CAPSULE | Refills: 0 | OUTPATIENT

## 2024-11-12 ENCOUNTER — WEB ENCOUNTER (OUTPATIENT)
Dept: URBAN - NONMETROPOLITAN AREA CLINIC 2 | Facility: CLINIC | Age: 49
End: 2024-11-12

## 2024-11-14 ENCOUNTER — ERX REFILL RESPONSE (OUTPATIENT)
Dept: URBAN - NONMETROPOLITAN AREA CLINIC 2 | Facility: CLINIC | Age: 49
End: 2024-11-14

## 2024-11-14 RX ORDER — OMEPRAZOLE 20 MG/1
TAKE 1 CAPSULE BY MOUTH EVERY MORNING 30 MINUTES BEFORE BREAKFAST CAPSULE, DELAYED RELEASE ORAL
Qty: 30 CAPSULE | Refills: 0 | OUTPATIENT

## 2024-11-15 ENCOUNTER — OFFICE VISIT (OUTPATIENT)
Dept: URBAN - NONMETROPOLITAN AREA CLINIC 2 | Facility: CLINIC | Age: 49
End: 2024-11-15

## 2024-12-16 ENCOUNTER — ERX REFILL RESPONSE (OUTPATIENT)
Dept: URBAN - NONMETROPOLITAN AREA CLINIC 2 | Facility: CLINIC | Age: 49
End: 2024-12-16

## 2024-12-16 RX ORDER — OMEPRAZOLE 20 MG/1
TAKE 1 CAPSULE BY MOUTH EVERY MORNING 30 MINUTES BEFORE BREAKFAST CAPSULE, DELAYED RELEASE ORAL
Qty: 30 CAPSULE | Refills: 0 | OUTPATIENT

## 2025-01-13 ENCOUNTER — ERX REFILL RESPONSE (OUTPATIENT)
Dept: URBAN - NONMETROPOLITAN AREA CLINIC 2 | Facility: CLINIC | Age: 50
End: 2025-01-13

## 2025-01-13 RX ORDER — OMEPRAZOLE 20 MG/1
TAKE 1 CAPSULE BY MOUTH EVERY MORNING 30 MINUTES BEFORE BREAKFAST CAPSULE, DELAYED RELEASE ORAL
Qty: 30 CAPSULE | Refills: 0 | OUTPATIENT

## 2025-02-07 ENCOUNTER — ERX REFILL RESPONSE (OUTPATIENT)
Dept: URBAN - NONMETROPOLITAN AREA CLINIC 2 | Facility: CLINIC | Age: 50
End: 2025-02-07

## 2025-02-07 RX ORDER — OMEPRAZOLE 20 MG/1
TAKE 1 CAPSULE BY MOUTH EVERY MORNING 30 MINUTES BEFORE BREAKFAST CAPSULE, DELAYED RELEASE ORAL
Qty: 30 CAPSULE | Refills: 11 | OUTPATIENT

## 2025-02-07 RX ORDER — OMEPRAZOLE 20 MG/1
TAKE 1 CAPSULE BY MOUTH EVERY MORNING 30 MINUTES BEFORE BREAKFAST CAPSULE, DELAYED RELEASE ORAL
Qty: 30 CAPSULE | Refills: 0 | OUTPATIENT

## 2025-02-21 ENCOUNTER — OFFICE VISIT (OUTPATIENT)
Dept: URBAN - NONMETROPOLITAN AREA CLINIC 2 | Facility: CLINIC | Age: 50
End: 2025-02-21
Payer: COMMERCIAL

## 2025-02-21 VITALS
HEART RATE: 82 BPM | WEIGHT: 194 LBS | SYSTOLIC BLOOD PRESSURE: 106 MMHG | BODY MASS INDEX: 30.45 KG/M2 | DIASTOLIC BLOOD PRESSURE: 70 MMHG | HEIGHT: 67 IN

## 2025-02-21 DIAGNOSIS — Z87.19 HISTORY OF DIVERTICULITIS: ICD-10-CM

## 2025-02-21 DIAGNOSIS — R07.89 OTHER CHEST PAIN: ICD-10-CM

## 2025-02-21 DIAGNOSIS — R14.0 BLOATING: ICD-10-CM

## 2025-02-21 DIAGNOSIS — K21.9 CHRONIC GERD: ICD-10-CM

## 2025-02-21 DIAGNOSIS — K76.9 LIVER LESION: ICD-10-CM

## 2025-02-21 DIAGNOSIS — K56.699 DIVERTICULAR STRICTURE: ICD-10-CM

## 2025-02-21 DIAGNOSIS — R14.3 FLATULENCE: ICD-10-CM

## 2025-02-21 DIAGNOSIS — Z12.11 COLON CANCER SCREENING: ICD-10-CM

## 2025-02-21 PROCEDURE — 99214 OFFICE O/P EST MOD 30 MIN: CPT | Performed by: INTERNAL MEDICINE

## 2025-02-21 RX ORDER — OMEPRAZOLE 20 MG/1
TAKE 1 CAPSULE BY MOUTH EVERY MORNING 30 MINUTES BEFORE BREAKFAST CAPSULE, DELAYED RELEASE ORAL
Qty: 30 CAPSULE | Refills: 11 | Status: ACTIVE | COMMUNITY

## 2025-02-21 RX ORDER — CETIRIZINE HYDROCHLORIDE 10 MG/1
1 TABLET TABLET, FILM COATED ORAL ONCE A DAY
Status: ACTIVE | COMMUNITY

## 2025-02-21 RX ORDER — FAMOTIDINE 40 MG/1
1 TABLET AT BEDTIME TABLET, FILM COATED ORAL ONCE A DAY
Qty: 90 | Refills: 3 | Status: ACTIVE | COMMUNITY

## 2025-02-21 RX ORDER — AMITRIPTYLINE HYDROCHLORIDE 10 MG/1
1 TABLET AT BEDTIME TABLET, FILM COATED ORAL ONCE A DAY
Status: ACTIVE | COMMUNITY

## 2025-02-21 RX ORDER — NEBIVOLOL 5 MG/1
1 TABLET TABLET ORAL ONCE A DAY
Status: ACTIVE | COMMUNITY

## 2025-02-21 RX ORDER — ATORVASTATIN CALCIUM 20 MG/1
1 TABLET TABLET, FILM COATED ORAL ONCE A DAY
Status: ACTIVE | COMMUNITY

## 2025-02-21 RX ORDER — NORETHINDRONE 0.35 MG/1
1 TABLET TABLET ORAL ONCE A DAY
Status: ACTIVE | COMMUNITY

## 2025-02-21 RX ORDER — GABAPENTIN 100 MG/1
1 CAPSULE CAPSULE ORAL ONCE A DAY
Status: ACTIVE | COMMUNITY

## 2025-02-21 RX ORDER — CALCIUM CARBONATE 300MG(750)
AS DIRECTED TABLET,CHEWABLE ORAL
Status: ON HOLD | COMMUNITY

## 2025-02-21 RX ORDER — FERROUS SULFATE TAB 325 MG (65 MG ELEMENTAL FE) 325 (65 FE) MG
1 TABLET TAB ORAL
Status: ACTIVE | COMMUNITY

## 2025-02-21 RX ORDER — OMEPRAZOLE 20 MG/1
1 CAPSULE 30 MINUTES BEFORE MORNING MEAL CAPSULE, DELAYED RELEASE ORAL ONCE A DAY
Qty: 90 | Refills: 3 | OUTPATIENT

## 2025-02-21 RX ORDER — ETONOGESTREL 68 MG/1
AS DIRECTED IMPLANT SUBCUTANEOUS
Status: ON HOLD | COMMUNITY

## 2025-02-21 NOTE — HPI-TODAY'S VISIT:
7/19/2021: Initial Gastroenterology Clinic Visit         45 year old female with past medical history of tobacco use, diverticulitis c/b abscess, endometriosis who presents for evaluation of abdominal discomfort.  Ms. Reagan has had a history of diverticulitis in 2014 c/b abscess. She notes that since her diagnosis of diverticulitis c/b abscess in 2014 she had had several episodes that feel like diverticulitis episodes where she will experience discomfort in the pelvic region/left side of the abdomen as well as abdominal bloating. She most recently experienced these symptoms three weeks ago when she ate a heavy meal while in [place]. Following the heavy meal, she experienced left lower quadrant abdominal discomfort. She subsequently transitioned to a clear liquid/low fiber diet which led to resolution of symptoms. She denied fevers or chills. She denied vomiting.  She did not have a CT scan following the most recent episode of abdominal discomfort.  She did not have a colonoscopy following her episode of diverticulitis in 2014.  She denies family history of inflammatory bowel disease or colon cancer.  She currently works in [employer] on [address].  7/19/2021: CBC, chemistry panel, LFTs normal. CRP normal. 8/17/2021: CT Abdomen and Pelvis with Contrast  IMPRESSION: 1. No acute findings in abdomen or pelvis. 2. Pancolonic diverticulosis. 10/6/2021: MRI Liver IMPRESSION: 1.  Indeterminate region of nonenhancement along the falciform ligament, as detailed above. Lesion has a benign appearance, and is stable in size dating back to 8/26/2014. Consider 12 month follow-up, if clinically warranted.  11/1/2021: Gastroenterology Follow-Up Visit Since Ms. Reagan's most recent clinic visit, she was diagnosed with COVID and has been dealing with symptoms of long COVID. She has not had any episodes of abdominal pain. She denies symptoms similar to her prior episodes of diverticulitis.  6/2/2022: Colonoscopy  Multiple small and large mouthed diverticula were found in the sigmoid colon. There was significant muscular hypertrophy of the sigmoid colon with restricted mobility of the colon. Several attempts were made at advancing the colonoscope through the sigmoid colon including placing Ms. Reagan on her back, withdrawing the colonoscope from the colon and re-inserting, and applying abdominal pressure throughout the procedure. Despite efforts, the colonoscope was unable to be advanced to the cecum. As a result, the colonoscopy was aborted. 6/14/2022: Re-Attempt at Colonoscopy with Dr. Jackson Many small and large mouthed diverticula were found in the sigmoid colon, transverse colon, ascending colon. Benign appearing intrinsic stenosis measuring 8 cm in length was found in the rectosigmoid colon and was traversed with a gastroscope (the colonoscope was unable to traverse the stricture). Mucosa was erythematous in this region likely indicating inflammatory stricture. The gastroscope was unable to be advanced to the cecum. The exam was otherwise without abnormality on direct and retroflexion.  7/13/2022: Gastroenterology Follow-Up Visit Ms. Reagan is currently being evaluated for long-COVID. Abdominal symptoms are stable. Weight is stable. Denies melena or hematochezia.  8/16/2022: Barium Enema IMPRESSION: 1.  7 mm filling defect within the distal transverse colon seen on supine overhead imaging which may represent a polyp. 2.  Mild narrowing and luminal irregularity within the distal descending colon which persists on multiple overhead and spot views measuring a diameter of 8 mm. Correlate with recent colonoscopy. 3.  Colonic diverticulosis.  11/2/2022: Underwent low anterior resection with diverting loop ileostomy with Dr. Domínguez, given suspicion for diverticular stricture in the distal descending colon/sigmoid colon. Surgery revealed extensive diverticular disease with adherence of the sigmoid colon to the pelvic sidewall.  Sigmoid folded over on itself leading to a stricture of the distal sigmoid.  The chronic inflammatory process extended to the proximal rectum requiring a lower resection plane.   11/2/2022: Pathology from surgery A.  SIGMOID COLON AND PROXIMAL RECTUM, RESECTION: DIVERTICULAR DISEASE WITH PERICOLONIC FIBROSIS, LYMPHOHISTIOCYTIC INFLAMMATION, AND FIBROUS ADHESIONS. BENIGN LYMPH NODES. NO MALIGNANCY IDENTIFIED. B.  COLON, LABELED ANASTOMOSIS RINGS, EXCISION: BENIGN SEGMENTS OF LARGE INTESTINE CONSISTENT WITH DONUTS. NO MALIGNANCY IDENTIFIED.  11/7/2022: Gastroenterology Follow-Up Visit Ms. Reagan presents for follow-up. She is continue to follow with Dr. Domínguez following her low anterior resection with diverting loop ileostomy. She has stopped tobacco use.  4/14/23: Ms. Valerie Reagan returns for follow-up of sigmoid stricture.  She is s/p low anterior resection with diverting loop ileostomy with Dr. Domínguez.  She has her Ileostomy taken down.  February and has been doing well with this.  She has been complaining of some chest pain and heartburn.  She is worried this may be due to her long COVID syndrome.  She is on Pepcid 40 mg at night for this.  She is having bowel movements again and is feeling well.  11/1/23: Ms. Reagan returns for follow-up of sigmoid stricture and chronic GERD.  She is s/p low anterior resection with diverting loop ileostomy with Dr. Domínguez. She has her Ileostomy taken down in February and has been doing well with this.  She had an EGD and colonoscopy. EGD with reflux esophagitis and colonoscopy with patent colonic anastomosis.  She is doing better on omeprazole 40 mg twice daily but would like to wean her meds.  5/1/24: Ms. Reagan returns for follow-up of sigmoid stricture and chronic GERD.  She is s/p low anterior resection with diverting loop ileostomy with Dr. Domínguez. She has her Ileostomy taken down in February 2023.  She has been having a lot of bloating and flatulence.  She is also having a lot of reflux particularly with exercise.  She is moving her bowels and her consistency apperas normal.  9/4/24: Ms. Reagan returns for follow-up of sigmoid stricture and chronic GERD.  Sigmoid stricture is felt to be due to diverticulitis history.  She is s/p low anterior resection with diverting loop ileostomy with Dr. Domínguez. She has her Ileostomy taken down in February 2023.Currently she complains of a lot of heartburn and regurgitation as well as bloating.  She is having bowel movements most days.  She has gained weight for unclear reasons.  She does complain of long COVID and sees of the long COVID clinic in [place] for this at [facility].  Antibiotics did not change her symptoms much.  She is still on omeprazole 20 mg in the AM and famotidine nightly.  2/21/25: Ms. Reagan returns for follow-up of sigmoid stricture and chronic GERD.  Sigmoid stricture is felt to be due to diverticulitis history.  She is s/p low anterior resection with Dr. Domínguez.  She has been on omeprazole and famotidine.  Bloating has been doing better since starting low dose naltrexone out of her long COVID clinic.  She did not pursue gastric emptying study as her symptoms are improving.  She is moving her bowels.  She remains on low dose PPI and is working to stop Pepcid.

## 2025-07-30 ENCOUNTER — TELEPHONE ENCOUNTER (OUTPATIENT)
Dept: URBAN - NONMETROPOLITAN AREA CLINIC 2 | Facility: CLINIC | Age: 50
End: 2025-07-30